# Patient Record
Sex: FEMALE | Race: OTHER
[De-identification: names, ages, dates, MRNs, and addresses within clinical notes are randomized per-mention and may not be internally consistent; named-entity substitution may affect disease eponyms.]

---

## 2018-02-27 ENCOUNTER — HOSPITAL ENCOUNTER (EMERGENCY)
Dept: HOSPITAL 54 - ER | Age: 23
Discharge: HOME | End: 2018-02-27
Payer: COMMERCIAL

## 2018-02-27 VITALS — DIASTOLIC BLOOD PRESSURE: 80 MMHG | SYSTOLIC BLOOD PRESSURE: 127 MMHG

## 2018-02-27 VITALS — HEIGHT: 68 IN | BODY MASS INDEX: 24.1 KG/M2 | WEIGHT: 159 LBS

## 2018-02-27 DIAGNOSIS — R19.7: ICD-10-CM

## 2018-02-27 DIAGNOSIS — R51: ICD-10-CM

## 2018-02-27 DIAGNOSIS — R10.13: ICD-10-CM

## 2018-02-27 DIAGNOSIS — R11.2: Primary | ICD-10-CM

## 2018-02-27 LAB
ALBUMIN SERPL BCP-MCNC: 4.2 G/DL (ref 3.4–5)
ALP SERPL-CCNC: 67 U/L (ref 46–116)
ALT SERPL W P-5'-P-CCNC: 24 U/L (ref 12–78)
AST SERPL W P-5'-P-CCNC: 18 U/L (ref 15–37)
BASOPHILS # BLD AUTO: 0.1 /CMM (ref 0–0.2)
BASOPHILS NFR BLD AUTO: 1.1 % (ref 0–2)
BILIRUB DIRECT SERPL-MCNC: 0.1 MG/DL (ref 0–0.2)
BILIRUB SERPL-MCNC: 0.5 MG/DL (ref 0.2–1)
BUN SERPL-MCNC: 15 MG/DL (ref 7–18)
CALCIUM SERPL-MCNC: 9.4 MG/DL (ref 8.5–10.1)
CHLORIDE SERPL-SCNC: 104 MMOL/L (ref 98–107)
CO2 SERPL-SCNC: 24 MMOL/L (ref 21–32)
CREAT SERPL-MCNC: 0.6 MG/DL (ref 0.6–1.3)
EOSINOPHIL # BLD AUTO: 0.1 /CMM (ref 0–0.7)
EOSINOPHIL NFR BLD AUTO: 1.4 % (ref 0–6)
GLUCOSE SERPL-MCNC: 86 MG/DL (ref 74–106)
HCT VFR BLD AUTO: 41 % (ref 33–45)
HGB BLD-MCNC: 14.1 G/DL (ref 11.5–14.8)
LIPASE SERPL-CCNC: 233 U/L (ref 73–393)
LYMPHOCYTES NFR BLD AUTO: 2.7 /CMM (ref 0.8–4.8)
LYMPHOCYTES NFR BLD AUTO: 37.4 % (ref 20–44)
MCH RBC QN AUTO: 30 PG (ref 26–33)
MCHC RBC AUTO-ENTMCNC: 35 G/DL (ref 31–36)
MCV RBC AUTO: 87 FL (ref 82–100)
MONOCYTES NFR BLD AUTO: 0.6 /CMM (ref 0.1–1.3)
MONOCYTES NFR BLD AUTO: 9.1 % (ref 2–12)
NEUTROPHILS # BLD AUTO: 3.6 /CMM (ref 1.8–8.9)
NEUTROPHILS NFR BLD AUTO: 51 % (ref 43–81)
PLATELET # BLD AUTO: 274 /CMM (ref 150–450)
POTASSIUM SERPL-SCNC: 3.5 MMOL/L (ref 3.5–5.1)
PROT SERPL-MCNC: 8 G/DL (ref 6.4–8.2)
RBC # BLD AUTO: 4.68 MIL/UL (ref 4–5.2)
RDW COEFFICIENT OF VARIATION: 11.7 (ref 11.5–15)
SODIUM SERPL-SCNC: 140 MMOL/L (ref 136–145)
WBC NRBC COR # BLD AUTO: 7.1 K/UL (ref 4.3–11)

## 2018-02-27 PROCEDURE — A4606 OXYGEN PROBE USED W OXIMETER: HCPCS

## 2018-02-27 PROCEDURE — Z7610: HCPCS

## 2018-02-27 NOTE — NUR
Patient discharged to home in stable condition. Written and verbal after care 
instructions given. Patient verbalizes understanding of instruction.IV removed. 
Catheter intact and site benign. Pressure and 4x4 applied to site. No bleeding 
noted. Prescription given.

## 2018-02-27 NOTE — NUR
NAUSEA / VOMITING SINCE 1200, NAD NOTED, VSS, RESP EVEN AND UNLABORED, PT ON 
MONITOR, WAITING FOR MD NOAH.